# Patient Record
Sex: FEMALE | Race: WHITE | NOT HISPANIC OR LATINO | Employment: STUDENT | URBAN - METROPOLITAN AREA
[De-identification: names, ages, dates, MRNs, and addresses within clinical notes are randomized per-mention and may not be internally consistent; named-entity substitution may affect disease eponyms.]

---
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
## 2025-01-16 ENCOUNTER — OFFICE VISIT (OUTPATIENT)
Dept: ALLERGY | Facility: CLINIC | Age: 20
End: 2025-01-16
Payer: COMMERCIAL

## 2025-01-16 DIAGNOSIS — Z91.010 ALLERGY TO PEANUTS: Primary | ICD-10-CM

## 2025-01-16 DIAGNOSIS — J45.20 MILD INTERMITTENT ASTHMA WITHOUT COMPLICATION: ICD-10-CM

## 2025-01-16 RX ORDER — EPINEPHRINE 0.3 MG/.3ML
1 INJECTION SUBCUTANEOUS ONCE
Qty: 6 EACH | Refills: 2 | Status: SHIPPED | OUTPATIENT
Start: 2025-01-16 | End: 2025-01-27

## 2025-01-16 RX ORDER — BUDESONIDE 0.5 MG/2ML
0.5 INHALANT ORAL DAILY
Qty: 180 ML | Refills: 3 | Status: SHIPPED | OUTPATIENT
Start: 2025-01-16 | End: 2025-01-27

## 2025-01-16 RX ORDER — ACETAMINOPHEN 500 MG
TABLET ORAL
COMMUNITY
Start: 2024-12-29

## 2025-01-16 RX ORDER — ALBUTEROL SULFATE 90 UG/1
2 INHALANT RESPIRATORY (INHALATION) EVERY 4 HOURS PRN
Qty: 18 G | Refills: 3 | Status: SHIPPED | OUTPATIENT
Start: 2025-01-16 | End: 2025-01-27

## 2025-01-16 NOTE — PROGRESS NOTES
The patient location is: [place]  The chief complaint leading to consultation is: fu peanut allergy, asthma    Visit type: audiovisual    Face to Face time with patient: 20  30 minutes of total time spent on the encounter, which includes face to face time and non-face to face time preparing to see the patient (eg, review of tests), Obtaining and/or reviewing separately obtained history, Documenting clinical information in the electronic or other health record, Independently interpreting results (not separately reported) and communicating results to the patient/family/caregiver, or Care coordination (not separately reported).     Each patient to whom he or she provides medical services by telemedicine is:  (1) informed of the relationship between the physician and patient and the respective role of any other health care provider with respect to management of the patient; and (2) notified that he or she may decline to receive medical services by telemedicine and may withdraw from such care at any time.    Notes:         CHIEF COMPLAINT: follow up peanut allergy    Pt continues PN as well as [place] avoidance. Denies interval accidental ingestions.  Did have episode of wheeze w URI in April. Has since continue routine inhaled budesonide 0.5 mg via nebs. No need albuterol since resolution of URI.  Continues work at "[employer]      Hx from 7/6/23:  Pt presents w mother. Since last visit has continued successful avoidance of peanuts as well as tree nuts. No interval need for epinephrine. Ok w almond. O/w avoids tree nuts.   Not interested in peanut immunotherapy. Had been curious about it in the past. Not interested in observed challenge at present. Last peanut ingestion/reaction was at 6 or 7 yrs of age.  Also w hx asthma, intermittent, seen by pulm, well controlled. Has been off daily ICS for well over 6 years.   Finished freshman year at [employer] in the fall. Currently commuting (concern of mold in dorm).    Review of  patient's allergies indicates:   Allergen Reactions    Cefdinir Hives    Nuts [tree nut]      Cashews    Peanut Nausea And Vomiting    Peanuts [peanut and related legumes] Edema    [place] Rash    Polyethylene glycol 3350 Diarrhea, Hives, Itching and Rash   [place] dressing--assoc hives. Hx positive prick to prick test.   Tolerates sour cream      Past Medical History:   Diagnosis Date    Allergy     Allergy to peanuts 7/19/2012    Asthma, well controlled     Atopy     Cough        Family History   Problem Relation Name Age of Onset    Cancer Mother Deana     Heart disease Maternal Grandmother      Rheum arthritis Maternal Grandmother      Heart disease Maternal Grandfather      Heart disease Paternal Grandmother      Diabetes Paternal Grandmother      Heart disease Paternal Grandfather      Diabetes Paternal Grandfather       Review of Systems   Constitutional:  Negative for chills, fever and malaise/fatigue.   HENT:  Negative for congestion, ear discharge, ear pain, hearing loss, nosebleeds, sinus pain and sore throat.    Eyes:  Negative for photophobia, pain, discharge and redness.   Respiratory:  Negative for cough, shortness of breath and wheezing.    Cardiovascular:  Negative for chest pain.   Gastrointestinal:  Negative for diarrhea, nausea and vomiting.   Musculoskeletal:  Negative for joint pain and myalgias.   Skin:  Negative for rash.   Neurological:  Negative for seizures and headaches.       Physical Exam        Results for JEB MONTES (MRN [id number]) as of 7/26/2016 17:08   Ref. Range 2/13/2007 11:31 3/13/2008 09:09 2/22/2010 10:35 7/22/2014 10:05   Almonds Latest Ref Range: <0.35 kU/L <0.35   <0.35   Cashew Class Unknown    CLASS I   Cashew IgE Latest Ref Range: <0.35 kU/L    0.49 (H)   Peanut Class Unknown    CLASS IV   Peanut IgE Latest Ref Range: <0.35 kU/L 2.06 1.04 0.38 47.00 (H)   Pecan (Food) Class Unknown    CLASS 0   Pecan Nut Latest Ref Range: <0.35 kU/L    <0.35    Pistachio  IgE Latest Ref Range: <0.35 kU/L    <0.35   Pistachio Class Unknown    CLASS 0   [place] Latest Ref Range: <0.35 kU/L    <0.35   [place] Class Unknown    CLASS 0       Skin test 8/22/17    3+ pos control  0+ neg control    3+ peanut     Latest Reference Range & Units 06/21/22 09:30   [place] h 1 (f422) <0.10 kU/L 13.80 (H)   [place] h 1 Class  CLASS 3   [place] h 2 (f423) <0.10 kU/L 28.20 (H)   [place] h 2 Class  CLASS 4   [place] h 3 (f424) <0.10 kU/L 5.29 (H)   [place] h 3 Class  CLASS 3   [place] h 6 (f447) <0.10 kU/L 27.20 (H)   [place] h 6 Class  CLASS 4   [place] h 8 (f352) <0.10 kU/L <0.10   Susana h 8 Class  CLASS 0   [place]ana h 9 (f427) <0.10 kU/L <0.10   Susana h 9 Class  CLASS 0   (H): Data is abnormally high  ASSESSMENT AND PLAN:     PROBLEM 1 - food allergy - h/o facial swelling and diffuse hives with peanuts   continue strict avoidance of peanuts and treenuts  carry epi pen at all times - dose verified. refilled  Defer survellance component peanut immunoCAP  Discussed Xolair. Defers for now. May reconsider when spending more time in [place].      PROBLEM 2 -   asthma. Prn albuterol.   Refill budesonide neb 0.5 mg daily via neb. 3 mo supply. Consider wean/stop in spring      Fu 1 year, sooner prn

## 2025-01-27 RX ORDER — ALBUTEROL SULFATE 90 UG/1
2 INHALANT RESPIRATORY (INHALATION) EVERY 4 HOURS PRN
Qty: 6.7 G | Refills: 3 | Status: SHIPPED | OUTPATIENT
Start: 2025-01-27 | End: 2026-01-27

## 2025-01-27 RX ORDER — EPINEPHRINE 0.3 MG/.3ML
1 INJECTION SUBCUTANEOUS ONCE
Qty: 6 EACH | Refills: 2 | Status: SHIPPED | OUTPATIENT
Start: 2025-01-27 | End: 2025-01-27

## 2025-01-27 RX ORDER — BUDESONIDE 0.5 MG/2ML
0.5 INHALANT ORAL DAILY
Qty: 180 ML | Refills: 3 | Status: SHIPPED | OUTPATIENT
Start: 2025-01-27

## 2025-03-28 ENCOUNTER — PATIENT MESSAGE (OUTPATIENT)
Dept: FAMILY MEDICINE | Facility: CLINIC | Age: 20
End: 2025-03-28
Payer: COMMERCIAL

## 2025-05-14 ENCOUNTER — PATIENT MESSAGE (OUTPATIENT)
Dept: FAMILY MEDICINE | Facility: CLINIC | Age: 20
End: 2025-05-14
Payer: COMMERCIAL